# Patient Record
Sex: FEMALE
[De-identification: names, ages, dates, MRNs, and addresses within clinical notes are randomized per-mention and may not be internally consistent; named-entity substitution may affect disease eponyms.]

---

## 2018-09-26 ENCOUNTER — HOSPITAL ENCOUNTER (EMERGENCY)
Dept: HOSPITAL 5 - ED | Age: 68
LOS: 1 days | Discharge: PSYCHIATRIC OR SUBSTANCE USE FACILITY | End: 2018-09-27
Payer: MEDICARE

## 2018-09-26 DIAGNOSIS — F43.10: ICD-10-CM

## 2018-09-26 DIAGNOSIS — E78.00: ICD-10-CM

## 2018-09-26 DIAGNOSIS — I10: ICD-10-CM

## 2018-09-26 DIAGNOSIS — Z00.8: Primary | ICD-10-CM

## 2018-09-26 DIAGNOSIS — F20.9: ICD-10-CM

## 2018-09-26 DIAGNOSIS — F41.9: ICD-10-CM

## 2018-09-26 LAB
BASOPHILS # (AUTO): 0.1 K/MM3 (ref 0–0.1)
BASOPHILS NFR BLD AUTO: 1 % (ref 0–1.8)
BENZODIAZEPINES SCREEN,URINE: (no result)
BILIRUB UR QL STRIP: (no result)
BLOOD UR QL VISUAL: (no result)
BUN SERPL-MCNC: 12 MG/DL (ref 7–17)
BUN/CREAT SERPL: 17 %
CALCIUM SERPL-MCNC: 10.4 MG/DL (ref 8.4–10.2)
EOSINOPHIL # BLD AUTO: 0.2 K/MM3 (ref 0–0.4)
EOSINOPHIL NFR BLD AUTO: 2.1 % (ref 0–4.3)
HCT VFR BLD CALC: 43.1 % (ref 30.3–42.9)
HEMOLYSIS INDEX: 17
HGB BLD-MCNC: 15.2 GM/DL (ref 10.1–14.3)
HYALINE CASTS #/AREA URNS LPF: 1 /LPF
LYMPHOCYTES # BLD AUTO: 2.7 K/MM3 (ref 1.2–5.4)
LYMPHOCYTES NFR BLD AUTO: 28.8 % (ref 13.4–35)
MCH RBC QN AUTO: 31 PG (ref 28–32)
MCHC RBC AUTO-ENTMCNC: 35 % (ref 30–34)
MCV RBC AUTO: 87 FL (ref 79–97)
METHADONE SCREEN,URINE: (no result)
MONOCYTES # (AUTO): 0.9 K/MM3 (ref 0–0.8)
MONOCYTES % (AUTO): 10.2 % (ref 0–7.3)
MUCOUS THREADS #/AREA URNS HPF: (no result) /HPF
OPIATE SCREEN,URINE: (no result)
PH UR STRIP: 5 [PH] (ref 5–7)
PLATELET # BLD: 341 K/MM3 (ref 140–440)
RBC # BLD AUTO: 4.96 M/MM3 (ref 3.65–5.03)
RBC #/AREA URNS HPF: 1 /HPF (ref 0–6)
UROBILINOGEN UR-MCNC: < 2 MG/DL (ref ?–2)
WBC #/AREA URNS HPF: 2 /HPF (ref 0–6)

## 2018-09-26 PROCEDURE — 80048 BASIC METABOLIC PNL TOTAL CA: CPT

## 2018-09-26 PROCEDURE — G0480 DRUG TEST DEF 1-7 CLASSES: HCPCS

## 2018-09-26 PROCEDURE — 36415 COLL VENOUS BLD VENIPUNCTURE: CPT

## 2018-09-26 PROCEDURE — 99285 EMERGENCY DEPT VISIT HI MDM: CPT

## 2018-09-26 PROCEDURE — 82550 ASSAY OF CK (CPK): CPT

## 2018-09-26 PROCEDURE — 96372 THER/PROPH/DIAG INJ SC/IM: CPT

## 2018-09-26 PROCEDURE — 85025 COMPLETE CBC W/AUTO DIFF WBC: CPT

## 2018-09-26 PROCEDURE — 81001 URINALYSIS AUTO W/SCOPE: CPT

## 2018-09-26 PROCEDURE — 80320 DRUG SCREEN QUANTALCOHOLS: CPT

## 2018-09-26 PROCEDURE — 80307 DRUG TEST PRSMV CHEM ANLYZR: CPT

## 2018-09-26 RX ADMIN — LORAZEPAM PRN MG: 2 INJECTION INTRAMUSCULAR; INTRAVENOUS at 12:05

## 2018-09-26 RX ADMIN — LORAZEPAM PRN MG: 2 INJECTION INTRAMUSCULAR; INTRAVENOUS at 22:12

## 2018-09-26 RX ADMIN — HYDROCHLOROTHIAZIDE SCH: 25 TABLET ORAL at 14:17

## 2018-09-26 NOTE — EMERGENCY DEPARTMENT REPORT
ED General Adult HPI





- General


Chief complaint: Psych


Stated complaint: MH


Time Seen by Provider: 09/26/18 10:33


Source: patient, EMS, RN notes reviewed


Mode of arrival: Ambulatory


Limitations: No Limitations





- History of Present Illness


Initial comments: 





This is a 68-year-old female who is not known to this provider previously, has 

a medical history of high cholesterol and hypertension,  psychiatric history of 

schizoaffective disorder and depression.  She is brought to the hospital by EMS 

for psychiatric evaluation.  Patient indicates that she feels depressed, 

paranoid and suicidal.  She indicates she might stab herself with a knife.  The 

symptoms are constant.  They're painless.  They do not radiate anywhere.  She 

indicates no headache, neck pain, chest pain, abdominal pain, shortness of 

breath or urinary symptoms.  She has chronic musculoskeletal back pain which is 

neither new, worsened or different.














-: Gradual


Consistency: constant


Improves with: none


Worsens with: none


Associated Symptoms: denies: confusion, chest pain, cough, diaphoresis, fever/

chills, headaches, loss of appetite, malaise, nausea/vomiting, rash, seizure, 

shortness of breath, syncope, weakness





- Related Data


 Home Medications











 Medication  Instructions  Recorded  Confirmed  Last Taken


 


AtorvaSTATin [Lipitor] 20 mg PO QHS 09/26/18 09/26/18 Unknown


 


Perphenazine [Trilafon] 4 mg PO QHS 09/26/18 09/26/18 Unknown


 


QUEtiapine [SEROquel] 25 mg PO QHS 09/26/18 09/26/18 Unknown


 


Telmisartan [Micardis] 20 mg PO DAILY 09/26/18 09/26/18 Unknown


 


hydroCHLOROthiazide [HCTZ] 25 mg PO QDAY 09/26/18 09/26/18 Unknown


 


rOPINIRole [Requip] 1 mg PO QHS 09/26/18 09/26/18 Unknown











 Allergies











Allergy/AdvReac Type Severity Reaction Status Date / Time


 


No Known Allergies Allergy   Verified 09/26/18 03:11














ED Review of Systems


ROS: 


Stated complaint: MH


Other details as noted in HPI





Constitutional: denies: fever


ENT: denies: epistaxis


Respiratory: denies: cough


Cardiovascular: denies: chest pain


Genitourinary: denies: dysuria


Musculoskeletal: back pain


Skin: denies: lesions


Neurological: denies: weakness


Psychiatric: suicidal thoughts





ED Past Medical Hx





- Past Medical History


Previous Medical History?: Yes


Hx Hypertension: Yes


Hx Psychiatric Treatment: Yes (anxiety, shcizo effective, PTSD)


Additional medical history: high chols





- Surgical History


Past Surgical History?: Yes


Additional Surgical History: bilateral knee replacements





- Social History


Smoking Status: Never Smoker


Substance Use Type: None





- Medications


Home Medications: 


 Home Medications











 Medication  Instructions  Recorded  Confirmed  Last Taken  Type


 


AtorvaSTATin [Lipitor] 20 mg PO QHS 09/26/18 09/26/18 Unknown History


 


Perphenazine [Trilafon] 4 mg PO QHS 09/26/18 09/26/18 Unknown History


 


QUEtiapine [SEROquel] 25 mg PO QHS 09/26/18 09/26/18 Unknown History


 


Telmisartan [Micardis] 20 mg PO DAILY 09/26/18 09/26/18 Unknown History


 


hydroCHLOROthiazide [HCTZ] 25 mg PO QDAY 09/26/18 09/26/18 Unknown History


 


rOPINIRole [Requip] 1 mg PO QHS 09/26/18 09/26/18 Unknown History














ED Physical Exam





- General


Limitations: No Limitations


General appearance: alert, in no apparent distress





- Head


Head exam: Present: atraumatic, normocephalic





- Eye


Eye exam: Present: normal appearance, EOMI.  Absent: nystagmus





- ENT


ENT exam: Present: normal exam, normal orophraynx, mucous membranes moist, 

normal external ear exam





- Neck


Neck exam: Present: normal inspection, full ROM.  Absent: tenderness, 

meningismus





- Respiratory


Respiratory exam: Present: normal lung sounds bilaterally.  Absent: respiratory 

distress





- Cardiovascular


Cardiovascular Exam: Present: regular rate, normal rhythm, normal heart sounds.

  Absent: bradycardia, tachycardia, irregular rhythm, systolic murmur, 

diastolic murmur, rubs, gallop





- GI/Abdominal


GI/Abdominal exam: Present: soft, normal bowel sounds.  Absent: distended, 

tenderness, guarding, rebound, rigid, pulsatile mass





- Extremities Exam


Extremities exam: Present: normal inspection, full ROM, normal capillary refill

, other (2+ pulses noted in the bilateral upper, lower extremities.  

Compartments soft.  No long bony tenderness.  The pelvis is stable.).  Absent: 

tenderness, pedal edema, joint swelling, calf tenderness





- Back Exam


Back exam: Present: normal inspection, full ROM.  Absent: tenderness, CVA 

tenderness (R), paraspinal tenderness, vertebral tenderness





- Neurological Exam


Neurological exam: Present: alert, oriented X3, CN II-XII intact, normal gait, 

other (Extraocular movements intact.  Tongue midline.  No facial droop.  Facial 

sensation intact to light touch in the V1, V2, V3 distribution bilaterally.  5 

and 5 strength in 4 extremities..  Sensation is intact to light touch in 4 

extremities.).  Absent: motor sensory deficit





- Psychiatric


Psychiatric exam: Present: suicidal ideation





- Skin


Skin exam: Present: warm, dry, intact, normal color.  Absent: rash





ED Course


 Vital Signs











  09/26/18 09/26/18





  03:06 10:56


 


Temperature 98.9 F 98.2 F


 


Pulse Rate 82 68


 


Respiratory 16 20





Rate  


 


Blood Pressure 176/95 


 


Blood Pressure  115/84





[Left]  


 


O2 Sat by Pulse 96 100





Oximetry  














ED Medical Decision Making





- Lab Data


Result diagrams: 


 09/26/18 03:35





 09/26/18 03:35








 Vital Signs











  09/26/18 09/26/18





  03:06 10:56


 


Temperature 98.9 F 98.2 F


 


Pulse Rate 82 68


 


Respiratory 16 20





Rate  


 


Blood Pressure 176/95 


 


Blood Pressure  115/84





[Left]  


 


O2 Sat by Pulse 96 100





Oximetry  











 Lab Results











  09/26/18 09/26/18 09/26/18 Range/Units





  03:35 03:35 03:35 


 


WBC     (4.5-11.0)  K/mm3


 


RBC     (3.65-5.03)  M/mm3


 


Hgb     (10.1-14.3)  gm/dl


 


Hct     (30.3-42.9)  %


 


MCV     (79-97)  fl


 


MCH     (28-32)  pg


 


MCHC     (30-34)  %


 


RDW     (13.2-15.2)  %


 


Plt Count     (140-440)  K/mm3


 


Lymph % (Auto)     (13.4-35.0)  %


 


Mono % (Auto)     (0.0-7.3)  %


 


Eos % (Auto)     (0.0-4.3)  %


 


Baso % (Auto)     (0.0-1.8)  %


 


Lymph #     (1.2-5.4)  K/mm3


 


Mono #     (0.0-0.8)  K/mm3


 


Eos #     (0.0-0.4)  K/mm3


 


Baso #     (0.0-0.1)  K/mm3


 


Seg Neutrophils %     (40.0-70.0)  %


 


Seg Neutrophils #     (1.8-7.7)  K/mm3


 


Sodium    136 L  (137-145)  mmol/L


 


Potassium    3.7  (3.6-5.0)  mmol/L


 


Chloride    96.5 L  ()  mmol/L


 


Carbon Dioxide    26  (22-30)  mmol/L


 


Anion Gap    17  mmol/L


 


BUN    12  (7-17)  mg/dL


 


Creatinine    0.7  (0.7-1.2)  mg/dL


 


Estimated GFR    > 60  ml/min


 


BUN/Creatinine Ratio    17  %


 


Glucose    89  ()  mg/dL


 


Calcium    10.4 H  (8.4-10.2)  mg/dL


 


Total Creatine Kinase     ()  units/L


 


Salicylates  < 0.3 L    (2.8-20.0)  mg/dL


 


Acetaminophen   < 5.0 L   (10.0-30.0)  ug/mL


 


Plasma/Serum Alcohol     (0-0.07)  %














  09/26/18 09/26/18 09/26/18 Range/Units





  03:35 03:35 03:35 


 


WBC   9.3   (4.5-11.0)  K/mm3


 


RBC   4.96   (3.65-5.03)  M/mm3


 


Hgb   15.2 H   (10.1-14.3)  gm/dl


 


Hct   43.1 H   (30.3-42.9)  %


 


MCV   87   (79-97)  fl


 


MCH   31   (28-32)  pg


 


MCHC   35 H   (30-34)  %


 


RDW   14.1   (13.2-15.2)  %


 


Plt Count   341   (140-440)  K/mm3


 


Lymph % (Auto)   28.8   (13.4-35.0)  %


 


Mono % (Auto)   10.2 H   (0.0-7.3)  %


 


Eos % (Auto)   2.1   (0.0-4.3)  %


 


Baso % (Auto)   1.0   (0.0-1.8)  %


 


Lymph #   2.7   (1.2-5.4)  K/mm3


 


Mono #   0.9 H   (0.0-0.8)  K/mm3


 


Eos #   0.2   (0.0-0.4)  K/mm3


 


Baso #   0.1   (0.0-0.1)  K/mm3


 


Seg Neutrophils %   57.9   (40.0-70.0)  %


 


Seg Neutrophils #   5.4   (1.8-7.7)  K/mm3


 


Sodium     (137-145)  mmol/L


 


Potassium     (3.6-5.0)  mmol/L


 


Chloride     ()  mmol/L


 


Carbon Dioxide     (22-30)  mmol/L


 


Anion Gap     mmol/L


 


BUN     (7-17)  mg/dL


 


Creatinine     (0.7-1.2)  mg/dL


 


Estimated GFR     ml/min


 


BUN/Creatinine Ratio     %


 


Glucose     ()  mg/dL


 


Calcium     (8.4-10.2)  mg/dL


 


Total Creatine Kinase    83  ()  units/L


 


Salicylates     (2.8-20.0)  mg/dL


 


Acetaminophen     (10.0-30.0)  ug/mL


 


Plasma/Serum Alcohol  < 0.01    (0-0.07)  %














- Medical Decision Making





Differential diagnosis, including but not limited to: Depression, suicidality, 

mood disorder, chronic hypertension, medical clearance for psychiatric placement














Assessment and plan: 68-year-old female with depression and suicidality with 

plan to stab herself.  She is afebrile with reassuring vital signs with the 

exception of hypertension that is not acutely decompensated or symptomatic.  

She is clinically sober, has a GCS of 15, and walks with a steady gait.  Her 

elevated blood pressure is appreciated, and we will continue her current 

outpatient medications.  She does not require emergent or dramatic de-

escalation of her blood pressure; please reference the [employer] 

[employer]ency physicians clinical policy on hypertension that is not acutely 

symptomatic.





A 1013 form was filled out, and her laboratory studies and physical exam are 

essentially unremarkable.  At this point in time, there does not appear to be 

an immediate medical contraindication to psychiatric admission, evaluation, 

consultation.  Crisis team was paged out.


Critical care attestation.: 


If time is entered above; I have spent that time in minutes in the direct care 

of this critically ill patient, excluding procedure time.








ED Disposition


Clinical Impression: 


 Medical clearance for psychiatric admission





Disposition: [id number]/[id number] PSY HOSP/PSY UNIT


Is pt being admited?: No


Does the pt Need Aspirin: No


Condition: Good


Referrals: 


PRIMARY CARE,MD [Primary Care Provider] - 3-5 Days

## 2018-09-27 VITALS — DIASTOLIC BLOOD PRESSURE: 85 MMHG | SYSTOLIC BLOOD PRESSURE: 188 MMHG

## 2018-09-27 RX ADMIN — HYDROCHLOROTHIAZIDE SCH MG: 25 TABLET ORAL at 10:10

## 2019-03-12 ENCOUNTER — HOSPITAL ENCOUNTER (EMERGENCY)
Dept: HOSPITAL 5 - ED | Age: 69
LOS: 1 days | Discharge: PSYCHIATRIC OR SUBSTANCE USE FACILITY | End: 2019-03-13
Payer: MEDICARE

## 2019-03-12 DIAGNOSIS — F43.12: ICD-10-CM

## 2019-03-12 DIAGNOSIS — Z96.653: ICD-10-CM

## 2019-03-12 DIAGNOSIS — F41.9: ICD-10-CM

## 2019-03-12 DIAGNOSIS — F20.9: Primary | ICD-10-CM

## 2019-03-12 DIAGNOSIS — I10: ICD-10-CM

## 2019-03-12 DIAGNOSIS — R45.851: ICD-10-CM

## 2019-03-12 DIAGNOSIS — E78.00: ICD-10-CM

## 2019-03-12 LAB
ALBUMIN SERPL-MCNC: 4.7 G/DL (ref 3.9–5)
ALT SERPL-CCNC: 20 UNITS/L (ref 7–56)
BASOPHILS # (AUTO): 0.1 K/MM3 (ref 0–0.1)
BASOPHILS NFR BLD AUTO: 1 % (ref 0–1.8)
BENZODIAZEPINES SCREEN,URINE: (no result)
BILIRUB UR QL STRIP: (no result)
BLOOD UR QL VISUAL: (no result)
BUN SERPL-MCNC: 11 MG/DL (ref 7–17)
BUN/CREAT SERPL: 14 %
CALCIUM SERPL-MCNC: 10.5 MG/DL (ref 8.4–10.2)
EOSINOPHIL # BLD AUTO: 0 K/MM3 (ref 0–0.4)
EOSINOPHIL NFR BLD AUTO: 0.1 % (ref 0–4.3)
HCT VFR BLD CALC: 41.1 % (ref 30.3–42.9)
HEMOLYSIS INDEX: 6
HGB BLD-MCNC: 14.6 GM/DL (ref 10.1–14.3)
LYMPHOCYTES # BLD AUTO: 1.9 K/MM3 (ref 1.2–5.4)
LYMPHOCYTES NFR BLD AUTO: 14.1 % (ref 13.4–35)
MCHC RBC AUTO-ENTMCNC: 36 % (ref 30–34)
MCV RBC AUTO: 86 FL (ref 79–97)
METHADONE SCREEN,URINE: (no result)
MONOCYTES # (AUTO): 1.2 K/MM3 (ref 0–0.8)
MONOCYTES % (AUTO): 8.6 % (ref 0–7.3)
MUCOUS THREADS #/AREA URNS HPF: (no result) /HPF
OPIATE SCREEN,URINE: (no result)
PH UR STRIP: 7 [PH] (ref 5–7)
PLATELET # BLD: 474 K/MM3 (ref 140–440)
PROT UR STRIP-MCNC: (no result) MG/DL
RBC # BLD AUTO: 4.79 M/MM3 (ref 3.65–5.03)
RBC #/AREA URNS HPF: 1 /HPF (ref 0–6)
UROBILINOGEN UR-MCNC: < 2 MG/DL (ref ?–2)
WBC #/AREA URNS HPF: < 1 /HPF (ref 0–6)

## 2019-03-12 PROCEDURE — 80053 COMPREHEN METABOLIC PANEL: CPT

## 2019-03-12 PROCEDURE — 85025 COMPLETE CBC W/AUTO DIFF WBC: CPT

## 2019-03-12 PROCEDURE — 81001 URINALYSIS AUTO W/SCOPE: CPT

## 2019-03-12 PROCEDURE — 36415 COLL VENOUS BLD VENIPUNCTURE: CPT

## 2019-03-12 PROCEDURE — 70450 CT HEAD/BRAIN W/O DYE: CPT

## 2019-03-12 PROCEDURE — 99285 EMERGENCY DEPT VISIT HI MDM: CPT

## 2019-03-12 PROCEDURE — 80307 DRUG TEST PRSMV CHEM ANLYZR: CPT

## 2019-03-12 PROCEDURE — 71046 X-RAY EXAM CHEST 2 VIEWS: CPT

## 2019-03-12 PROCEDURE — 80320 DRUG SCREEN QUANTALCOHOLS: CPT

## 2019-03-12 PROCEDURE — G0480 DRUG TEST DEF 1-7 CLASSES: HCPCS

## 2019-03-12 NOTE — XRAY REPORT
PROCEDURE:  XR CHEST ROUTINE 2V 

 

TECHNIQUE:  PA and lateral views of the chest. 

 

HISTORY: Medical Clearance Psych 

 

COMPARISONS:  None 

 

FINDINGS: 

Lines, tubes, and devices: N/A 

 

Lungs and pleura: Trachea is normal in position. Lungs are clear of infiltrate, pleural effusion, vas
cular congestion, or pneumothorax. No change 

 

Cardiomediastinal silhouette: Cardiac and mediastinal silhouettes are unremarkable. 

 

Other: Bony structures are intact. 

 

 

IMPRESSION: 

 

No acute cardiopulmonary process seen. No change.. 

 

This document is electronically signed by Lori Rivero MD., March 12 2019 07:47:00 PM ET

## 2019-03-12 NOTE — CAT SCAN REPORT
PROCEDURE: CT HEAD/BRAIN WO CON 

 

TECHNIQUE:  Axial images of the head obtained without intravenous contrast. 

 

HISTORY: hallucination 

 

COMPARISONS: No priors  

 

FINDINGS: 

 

The ventricles are normal in size and configuration. 

No intracranial mass or mass effect. 

No edema or sulcal effacement. 

No intracranial hemorrhage or hematoma. 

Atherosclerotic calcifications noted. 

The calvarium is intact. 

The visualized paranasal sinuses and mastoid air cells are clear. 

 

IMPRESSION:  

 

No evidence of acute intracranial pathology.. 

 

This document is electronically signed by New Frausto MD., March 12 2019 07:33:08 PM ET

## 2019-03-12 NOTE — EMERGENCY DEPARTMENT REPORT
Blank Doc





- Documentation


Documentation: 





67 y/o female c/o of auditory hallucinations. not suicidal not homicidal

## 2019-03-12 NOTE — EMERGENCY DEPARTMENT REPORT
ED Psych HPI





- General


Chief Complaint: Medical Clearance


Stated Complaint: MED CLEARANCE


Time Seen by Provider: 03/12/19 16:50


Source: patient


Mode of arrival: Ambulatory





- History of Present Illness


Initial Comments: 





Patient is 68 years old female with history of schizophrenia.  Patient presented

to the ER stating that she is hearing voices asking to kill herself.  Patient st

ated that she has this EVERY time she see the knife in the kitchen.  Patient 

stated that she had 2 suicide attempt before by cutting her left wrist and one-

time by drug overdose.  Patient denied any visual hallucination or homicidal 

ideation.


MD Complaint: suicidal ideation, feels depressed


-: days(s)


Associated Psychiatric Symptoms: suicidal ideation, racing thoughts, auditory 

hallucinations


History of same: Yes


Quality: constant


Associated Symptoms: denies other symptoms


Treatments Prior to Arrival: none


If Self Harm: admits thoughts of, has plan, self-inflicted trauma





- Related Data


                                Home Medications











 Medication  Instructions  Recorded  Confirmed  Last Taken


 


Perphenazine [Trilafon] 4 mg PO QHS 09/26/18 03/12/19 Unknown


 


QUEtiapine [SEROquel] 25 mg PO QHS 09/26/18 03/12/19 Unknown


 


Telmisartan [Micardis] 20 mg PO DAILY 09/26/18 03/12/19 Unknown


 


hydroCHLOROthiazide [HCTZ] 25 mg PO QDAY 09/26/18 03/12/19 Unknown


 


rOPINIRole [Requip] 1 mg PO QHS 09/26/18 03/12/19 Unknown


 


Rosuvastatin (Nf) [Crestor] 20 mg PO QHS 03/12/19 03/12/19 Unknown











                                    Allergies











Allergy/AdvReac Type Severity Reaction Status Date / Time


 


No Known Allergies Allergy   Verified 03/12/19 16:39














ED Review of Systems


ROS: 


Stated complaint: MED CLEARANCE


Other details as noted in HPI





Comment: All other systems reviewed and negative


Constitutional: denies: chills, fever


Respiratory: denies: cough, orthopnea, shortness of breath, SOB with exertion, 

SOB at rest, wheezing


Cardiovascular: denies: chest pain, palpitations


Gastrointestinal: denies: abdominal pain, nausea, vomiting, diarrhea, 

constipation, hematemesis, hematochezia


Musculoskeletal: denies: back pain


Neurological: denies: headache, weakness, numbness, paresthesias, confusion, 

abnormal gait





ED Past Medical Hx





- Past Medical History


Hx Hypertension: Yes


Hx Psychiatric Treatment: Yes (anxiety, shcizo effective, PTSD)


Additional medical history: high chols





- Surgical History


Additional Surgical History: bilateral knee replacements





- Social History


Smoking Status: Never Smoker


Substance Use Type: None





- Medications


Home Medications: 


                                Home Medications











 Medication  Instructions  Recorded  Confirmed  Last Taken  Type


 


Perphenazine [Trilafon] 4 mg PO QHS 09/26/18 03/12/19 Unknown History


 


QUEtiapine [SEROquel] 25 mg PO QHS 09/26/18 03/12/19 Unknown History


 


Telmisartan [Micardis] 20 mg PO DAILY 09/26/18 03/12/19 Unknown History


 


hydroCHLOROthiazide [HCTZ] 25 mg PO QDAY 09/26/18 03/12/19 Unknown History


 


rOPINIRole [Requip] 1 mg PO QHS 09/26/18 03/12/19 Unknown History


 


Rosuvastatin (Nf) [Crestor] 20 mg PO QHS 03/12/19 03/12/19 Unknown History














ED Physical Exam





- General


Limitations: No Limitations


General appearance: alert, in no apparent distress





- Head


Head exam: Present: atraumatic, normocephalic, normal inspection





- Eye


Eye exam: Present: normal appearance, PERRL





- ENT


ENT exam: Present: normal exam, normal orophraynx, mucous membranes moist





- Neck


Neck exam: Present: normal inspection, full ROM.  Absent: tenderness, 

meningismus, lymphadenopathy, thyromegaly





- Respiratory


Respiratory exam: Present: normal lung sounds bilaterally





- Cardiovascular


Cardiovascular Exam: Present: regular rate, normal rhythm, normal heart sounds





- GI/Abdominal


GI/Abdominal exam: Present: soft, normal bowel sounds.  Absent: distended, 

tenderness, guarding, rebound, rigid, organomegaly, mass, bruit, pulsatile mass,

hernia





- Extremities Exam


Extremities exam: Present: normal inspection, full ROM, normal capillary refill.

 Absent: tenderness, pedal edema, joint swelling, calf tenderness





- Back Exam


Back exam: Present: normal inspection, full ROM.  Absent: tenderness, CVA 

tenderness (R), CVA tenderness (L), muscle spasm, paraspinal tenderness, vert

ebral tenderness





- Neurological Exam


Neurological exam: Present: alert, oriented X3, CN II-XII intact, normal gait, 

reflexes normal





- Psychiatric


Psychiatric exam: Present: normal affect, normal mood, suicidal ideation.  A

bsent: agitated, anxious, flat affect, manic, homicidal ideation





- Skin


Skin exam: Present: warm, intact, normal color





ED Course





                                   Vital Signs











  03/12/19





  16:39


 


Temperature 99.2 F


 


Pulse Rate 104 H


 


Respiratory 18





Rate 


 


Blood Pressure 176/93


 


O2 Sat by Pulse 98





Oximetry 














ED Medical Decision Making





- Lab Data


Result diagrams: 


                                 03/12/19 17:53





                                 03/12/19 17:53


Critical care attestation.: 


If time is entered above; I have spent that time in minutes in the direct care 

of this critically ill patient, excluding procedure time.








ED Disposition


Clinical Impression: 


 Suicidal ideation, Schizophrenia





Disposition: DC/[id number] PSY HOSP/PSY UNIT


Is pt being admited?: No


Condition: Stable

## 2019-03-13 VITALS — SYSTOLIC BLOOD PRESSURE: 156 MMHG | DIASTOLIC BLOOD PRESSURE: 86 MMHG

## 2019-09-18 ENCOUNTER — APPOINTMENT (RX ONLY)
Dept: URBAN - METROPOLITAN AREA OTHER 9 | Facility: OTHER | Age: 69
Setting detail: DERMATOLOGY
End: 2019-09-18

## 2019-09-18 DIAGNOSIS — D22 MELANOCYTIC NEVI: ICD-10-CM

## 2019-09-18 DIAGNOSIS — L44.8 OTHER SPECIFIED PAPULOSQUAMOUS DISORDERS: ICD-10-CM

## 2019-09-18 DIAGNOSIS — Z71.89 OTHER SPECIFIED COUNSELING: ICD-10-CM

## 2019-09-18 DIAGNOSIS — L30.4 ERYTHEMA INTERTRIGO: ICD-10-CM

## 2019-09-18 DIAGNOSIS — L81.4 OTHER MELANIN HYPERPIGMENTATION: ICD-10-CM

## 2019-09-18 DIAGNOSIS — L82.1 OTHER SEBORRHEIC KERATOSIS: ICD-10-CM

## 2019-09-18 PROBLEM — J45.909 UNSPECIFIED ASTHMA, UNCOMPLICATED: Status: ACTIVE | Noted: 2019-09-18

## 2019-09-18 PROBLEM — I10 ESSENTIAL (PRIMARY) HYPERTENSION: Status: ACTIVE | Noted: 2019-09-18

## 2019-09-18 PROBLEM — D22.5 MELANOCYTIC NEVI OF TRUNK: Status: ACTIVE | Noted: 2019-09-18

## 2019-09-18 PROBLEM — F32.9 MAJOR DEPRESSIVE DISORDER, SINGLE EPISODE, UNSPECIFIED: Status: ACTIVE | Noted: 2019-09-18

## 2019-09-18 PROBLEM — F41.9 ANXIETY DISORDER, UNSPECIFIED: Status: ACTIVE | Noted: 2019-09-18

## 2019-09-18 PROCEDURE — ? COUNSELING

## 2019-09-18 PROCEDURE — 99203 OFFICE O/P NEW LOW 30 MIN: CPT

## 2019-09-18 PROCEDURE — ? TREATMENT REGIMEN

## 2019-09-18 ASSESSMENT — LOCATION ZONE DERM
LOCATION ZONE: ARM
LOCATION ZONE: LEG
LOCATION ZONE: TRUNK

## 2019-09-18 ASSESSMENT — LOCATION DETAILED DESCRIPTION DERM
LOCATION DETAILED: LEFT DISTAL DORSAL FOREARM
LOCATION DETAILED: LEFT ANTERIOR DISTAL THIGH
LOCATION DETAILED: LEFT INFRAMAMMARY CREASE (INNER QUADRANT)
LOCATION DETAILED: INFERIOR THORACIC SPINE
LOCATION DETAILED: RIGHT RIB CAGE
LOCATION DETAILED: SUPERIOR THORACIC SPINE

## 2019-09-18 ASSESSMENT — LOCATION SIMPLE DESCRIPTION DERM
LOCATION SIMPLE: LEFT BREAST
LOCATION SIMPLE: ABDOMEN
LOCATION SIMPLE: LEFT THIGH
LOCATION SIMPLE: UPPER BACK
LOCATION SIMPLE: LEFT FOREARM

## 2019-09-18 NOTE — PROCEDURE: TREATMENT REGIMEN
Samples Given: Neutrogena Ultra-Sheer SPF
Detail Level: Zone
Otc Regimen: Recommended Neutrogena Ultra-Sheer SPF
Plan: The patient was recommended to apply Zeasorb powder beneath the breasts daily as preventative measure, and if a rash appears she is to apply a mixture of hydrocortisone and Clotrimazole creams to the rash beneath her breasts twice daily until resolved. These product names and instructions were written down for the patient before leaving today.
Otc Regimen: Zeasorb powder, Hydrocortisone mixed with Clotrimazole

## 2020-08-31 ENCOUNTER — OFFICE VISIT (OUTPATIENT)
Dept: URBAN - METROPOLITAN AREA CLINIC 109 | Facility: CLINIC | Age: 70
End: 2020-08-31

## 2020-10-30 ENCOUNTER — OFFICE VISIT (OUTPATIENT)
Dept: URBAN - METROPOLITAN AREA CLINIC 109 | Facility: CLINIC | Age: 70
End: 2020-10-30

## 2020-11-02 ENCOUNTER — DASHBOARD ENCOUNTERS (OUTPATIENT)
Age: 70
End: 2020-11-02

## 2020-11-03 ENCOUNTER — LAB OUTSIDE AN ENCOUNTER (OUTPATIENT)
Dept: URBAN - METROPOLITAN AREA TELEHEALTH 2 | Facility: TELEHEALTH | Age: 70
End: 2020-11-03

## 2020-11-03 ENCOUNTER — OFFICE VISIT (OUTPATIENT)
Dept: URBAN - METROPOLITAN AREA TELEHEALTH 2 | Facility: TELEHEALTH | Age: 70
End: 2020-11-03
Payer: MEDICARE

## 2020-11-03 DIAGNOSIS — Z80.0 FAMILY HISTORY OF COLON CANCER: ICD-10-CM

## 2020-11-03 DIAGNOSIS — R10.13 EPIGASTRIC PAIN: ICD-10-CM

## 2020-11-03 DIAGNOSIS — I10 ESSENTIAL HYPERTENSION: ICD-10-CM

## 2020-11-03 DIAGNOSIS — K21.9 GASTROESOPHAGEAL REFLUX DISEASE WITHOUT ESOPHAGITIS: ICD-10-CM

## 2020-11-03 PROBLEM — 59621000: Status: ACTIVE | Noted: 2020-11-03

## 2020-11-03 PROBLEM — 266435005: Status: ACTIVE | Noted: 2020-11-03

## 2020-11-03 PROCEDURE — 1036F TOBACCO NON-USER: CPT | Performed by: INTERNAL MEDICINE

## 2020-11-03 PROCEDURE — G8417 CALC BMI ABV UP PARAM F/U: HCPCS | Performed by: INTERNAL MEDICINE

## 2020-11-03 PROCEDURE — G9903 PT SCRN TBCO ID AS NON USER: HCPCS | Performed by: INTERNAL MEDICINE

## 2020-11-03 PROCEDURE — 3017F COLORECTAL CA SCREEN DOC REV: CPT | Performed by: INTERNAL MEDICINE

## 2020-11-03 PROCEDURE — 99203 OFFICE O/P NEW LOW 30 MIN: CPT | Performed by: INTERNAL MEDICINE

## 2020-11-03 RX ORDER — ROPINIROLE 3 MG/1
1 TABLET 1 TO 3 HOURS BEFORE BEDTIME TABLET, FILM COATED ORAL ONCE A DAY
Status: ACTIVE | COMMUNITY

## 2020-11-03 RX ORDER — HYDROXYZINE HYDROCHLORIDE 25 MG/ML
2 ML AS NEEDED INJECTION, SOLUTION INTRAMUSCULAR
Status: ACTIVE | COMMUNITY

## 2020-11-03 RX ORDER — FAMOTIDINE 40 MG/1
1 TABLET AT BEDTIME TABLET, FILM COATED ORAL ONCE A DAY
Status: ACTIVE | COMMUNITY

## 2020-11-03 RX ORDER — PERPHENAZINE 4 MG/1
1 TABLET TABLET ORAL TWICE A DAY
Status: ACTIVE | COMMUNITY

## 2020-11-03 RX ORDER — ALPRAZOLAM 0.5 MG/1
1 TABLET TABLET ORAL TWICE A DAY
Status: ACTIVE | COMMUNITY

## 2020-11-03 RX ORDER — ROSUVASTATIN CALCIUM 10 MG/1
1 TABLET TABLET, FILM COATED ORAL ONCE A DAY
Status: ACTIVE | COMMUNITY

## 2020-11-03 RX ORDER — QUETIAPINE 100 MG/1
1 TABLET AT BEDTIME TABLET, FILM COATED ORAL ONCE A DAY
Status: ACTIVE | COMMUNITY

## 2020-11-03 RX ORDER — VALSARTAN AND HYDROCHLOROTHIAZIDE 160; 12.5 MG/1; MG/1
1 TABLET TABLET, FILM COATED ORAL ONCE A DAY
Status: ACTIVE | COMMUNITY

## 2020-11-03 NOTE — HPI-TODAY'S VISIT:
The patient has been referred for screening colonoscopy and evaluation of upper abdominal pain.  Her last colonoscopy was about 7 years ago.  Her brother had colon cancer at age 39.  She has no history of polyps and reports no blood in the stool, lower abdominal pain or change in bowel habits. The patient complains of dull epigastric pain mostly at nighttime which decreases with food intake.  She lost about 10 pounds but has regained most of this.  The patient has been on famotidine 40 mg twice daily for acid reflux which appears to be well controlled.  She has had no melenic stools, nausea or vomiting. The patient's general health has been good.  She has hypertension, reflux, hyperlipidemia, anxiety and depression.  Most of her conditions as she describes them are well controlled.

## 2021-09-14 ENCOUNTER — TELEPHONE ENCOUNTER (OUTPATIENT)
Dept: URBAN - METROPOLITAN AREA CLINIC 40 | Facility: CLINIC | Age: 71
End: 2021-09-14

## 2021-09-15 ENCOUNTER — TELEPHONE ENCOUNTER (OUTPATIENT)
Dept: URBAN - METROPOLITAN AREA SURGERY CENTER 30 | Facility: SURGERY CENTER | Age: 71
End: 2021-09-15

## 2021-09-16 PROBLEM — 275978004 COLON CANCER SCREENING: Status: ACTIVE | Noted: 2021-09-16

## 2021-10-07 ENCOUNTER — OFFICE VISIT (OUTPATIENT)
Dept: URBAN - METROPOLITAN AREA SURGERY CENTER 23 | Facility: SURGERY CENTER | Age: 71
End: 2021-10-07

## 2021-11-05 ENCOUNTER — OFFICE VISIT (OUTPATIENT)
Dept: URBAN - METROPOLITAN AREA SURGERY CENTER 23 | Facility: SURGERY CENTER | Age: 71
End: 2021-11-05

## 2021-11-18 ENCOUNTER — OFFICE VISIT (OUTPATIENT)
Dept: URBAN - METROPOLITAN AREA SURGERY CENTER 23 | Facility: SURGERY CENTER | Age: 71
End: 2021-11-18

## 2022-08-17 ENCOUNTER — OFFICE VISIT (OUTPATIENT)
Dept: URBAN - METROPOLITAN AREA CLINIC 118 | Facility: CLINIC | Age: 72
End: 2022-08-17

## 2022-08-22 ENCOUNTER — OFFICE VISIT (OUTPATIENT)
Dept: URBAN - METROPOLITAN AREA CLINIC 118 | Facility: CLINIC | Age: 72
End: 2022-08-22

## 2022-10-12 ENCOUNTER — OFFICE VISIT (OUTPATIENT)
Dept: URBAN - METROPOLITAN AREA CLINIC 118 | Facility: CLINIC | Age: 72
End: 2022-10-12

## 2023-01-23 ENCOUNTER — P2P PATIENT RECORD (OUTPATIENT)
Age: 73
End: 2023-01-23

## 2023-05-24 ENCOUNTER — OFFICE VISIT (OUTPATIENT)
Dept: URBAN - METROPOLITAN AREA CLINIC 118 | Facility: CLINIC | Age: 73
End: 2023-05-24

## 2023-07-19 ENCOUNTER — OFFICE VISIT (OUTPATIENT)
Dept: URBAN - METROPOLITAN AREA CLINIC 118 | Facility: CLINIC | Age: 73
End: 2023-07-19